# Patient Record
Sex: FEMALE | Race: AMERICAN INDIAN OR ALASKA NATIVE
[De-identification: names, ages, dates, MRNs, and addresses within clinical notes are randomized per-mention and may not be internally consistent; named-entity substitution may affect disease eponyms.]

---

## 2019-08-09 ENCOUNTER — HOSPITAL ENCOUNTER (OUTPATIENT)
Dept: HOSPITAL 5 - MAMMO | Age: 39
Discharge: HOME | End: 2019-08-09
Attending: OBSTETRICS & GYNECOLOGY
Payer: COMMERCIAL

## 2019-08-09 DIAGNOSIS — N63.22: Primary | ICD-10-CM

## 2019-08-09 PROCEDURE — 77066 DX MAMMO INCL CAD BI: CPT

## 2019-08-09 NOTE — ULTRASOUND REPORT
Left breast ultrasound



History:  Palpable abnormality made o'clock position of the left breast.



Findings: No solid or cystic lesions are seen in the left breast.



Impression: This is a negative left breast ultrasound.



Note: Negative mammogram and ultrasound should not preclude further evaluation or biopsy of a clinica
lly suspicious palpable abnormality.  



Signer Name: Randell Osorio MD 

Signed: 8/9/2019 3:51 PM

 Workstation Name: [id number]

## 2019-08-09 NOTE — MAMMOGRAPHY REPORT
DIGITAL DIAGNOSTIC MAMMOGRAM WITH CAD, 8/9/2019

 

INDICATION: Palpable abnormality in the left breast 



TECHNIQUE:  Digital bilateral mammographic imaging was performed. Spot compression views were obtaine
d.

This examination was interpreted with the benefit of Computer-Aided Detection (CAD) analysis. 



COMPARISON: None available



FINDINGS: 



Breast Density: There are scattered areas of fibroglandular density.



There is no evidence of dominant mass, suspicious calcifications or architectural distortion in the l
eft breast. There is asymmetric breast tissue in the 3:00 position of the right breast. No suspicious
 lesions are seen in either breast. There is no focal mammographic abnormality to correspond to the a
inga of palpable concern on the left. Please see separate ultrasound report.



IMPRESSION:



BI-RADS Category 2:  Benign. Unless otherwise clinically indicated, recommend patient return to [facility] screening mammography at age 40.







A "normal" or negative report should not discourage follow up or biopsy of a clinically significant f
inding.



A written summary of these findings will be mailed to the patient. The patient will be entered into a
 mammography reporting system which will generate a reminder letter for the patient's next appointmen
t at the appropriate interval.



FURTHER INFORMATION:  According to the [employer], yearly mammograms are recommend
ed starting at age 40 and continuing as long as a woman is in good health. Clinical Breast Exams shou
ld be part of a periodic health exam-about every 3 years for women in their 20s and 30s and every yea
r for women 40 and over. Breast self exam is an option for women starting in their 20s. Any breast ch
roxanne noted on a breast self exam should be reported promptly to the patient's healthcare provider. Br
east MRI is recommended for women with an approximately 20-25% or greater lifetime risk of breast can
cer, including women with a strong family history of breast or ovarian cancer and women who have been
 treated for Hodgkin's disease.



Signer Name: Randell Osorio MD 

Signed: 8/9/2019 3:56 PM

 Workstation Name: [id number]